# Patient Record
Sex: MALE | Race: WHITE | NOT HISPANIC OR LATINO | Employment: OTHER | URBAN - METROPOLITAN AREA
[De-identification: names, ages, dates, MRNs, and addresses within clinical notes are randomized per-mention and may not be internally consistent; named-entity substitution may affect disease eponyms.]

---

## 2017-07-13 ENCOUNTER — ALLSCRIPTS OFFICE VISIT (OUTPATIENT)
Dept: OTHER | Facility: OTHER | Age: 67
End: 2017-07-13

## 2017-07-13 LAB
BILIRUB UR QL STRIP: NEGATIVE
CLARITY UR: NORMAL
COLOR UR: YELLOW
GLUCOSE (HISTORICAL): NEGATIVE
HGB UR QL STRIP.AUTO: NEGATIVE
KETONES UR STRIP-MCNC: NEGATIVE MG/DL
LEUKOCYTE ESTERASE UR QL STRIP: NEGATIVE
NITRITE UR QL STRIP: NEGATIVE
PH UR STRIP.AUTO: 5.5 [PH]
PROT UR STRIP-MCNC: NEGATIVE MG/DL
SP GR UR STRIP.AUTO: 1.01
UROBILINOGEN UR QL STRIP.AUTO: 0.2

## 2018-01-13 VITALS
BODY MASS INDEX: 35.7 KG/M2 | WEIGHT: 241 LBS | SYSTOLIC BLOOD PRESSURE: 104 MMHG | DIASTOLIC BLOOD PRESSURE: 60 MMHG | HEIGHT: 69 IN

## 2018-04-11 LAB — GLUCOSE SERPL-MCNC: 111 MG/DL (ref 70–99)

## 2018-05-09 DIAGNOSIS — N40.1 BENIGN LOCALIZED HYPERPLASIA OF PROSTATE WITH URINARY OBSTRUCTION AND LOWER URINARY TRACT SYMPTOMS: Primary | ICD-10-CM

## 2018-05-09 DIAGNOSIS — N13.9 BENIGN LOCALIZED HYPERPLASIA OF PROSTATE WITH URINARY OBSTRUCTION AND LOWER URINARY TRACT SYMPTOMS: Primary | ICD-10-CM

## 2018-05-09 RX ORDER — TAMSULOSIN HYDROCHLORIDE 0.4 MG/1
0.4 CAPSULE ORAL
Qty: 90 CAPSULE | Refills: 1 | Status: SHIPPED | OUTPATIENT
Start: 2018-05-09 | End: 2018-11-02 | Stop reason: SDUPTHER

## 2018-07-13 DIAGNOSIS — C61 MALIGNANT NEOPLASM OF PROSTATE (HCC): ICD-10-CM

## 2018-07-19 RX ORDER — MONTELUKAST SODIUM 10 MG/1
10 TABLET ORAL DAILY
Refills: 3 | COMMUNITY
Start: 2018-07-01

## 2018-07-19 RX ORDER — DAPAGLIFLOZIN 5 MG/1
5 TABLET, FILM COATED ORAL DAILY
Refills: 1 | COMMUNITY
Start: 2018-06-22 | End: 2019-07-30 | Stop reason: ALTCHOICE

## 2018-07-19 RX ORDER — DESLORATADINE 5 MG/1
1 TABLET ORAL DAILY
COMMUNITY

## 2018-07-19 RX ORDER — SIMVASTATIN 20 MG
TABLET ORAL
Refills: 3 | COMMUNITY
Start: 2018-04-24

## 2018-07-19 RX ORDER — FLUTICASONE PROPIONATE 50 MCG
SPRAY, SUSPENSION (ML) NASAL
Refills: 3 | COMMUNITY
Start: 2018-06-03

## 2018-07-19 RX ORDER — LOSARTAN POTASSIUM 50 MG/1
50 TABLET ORAL DAILY
Refills: 1 | COMMUNITY
Start: 2018-07-01

## 2018-07-19 RX ORDER — BIOTIN 1 MG
1 TABLET ORAL DAILY
COMMUNITY

## 2018-07-19 RX ORDER — LORATADINE 10 MG/1
10 TABLET ORAL
COMMUNITY
Start: 2014-04-02

## 2018-07-19 RX ORDER — ASPIRIN 81 MG/1
81 TABLET ORAL
COMMUNITY
Start: 2009-04-20

## 2018-07-19 RX ORDER — METFORMIN HYDROCHLORIDE 500 MG/1
TABLET, EXTENDED RELEASE ORAL
Refills: 1 | COMMUNITY
Start: 2018-05-14

## 2018-07-23 ENCOUNTER — OFFICE VISIT (OUTPATIENT)
Dept: UROLOGY | Facility: MEDICAL CENTER | Age: 68
End: 2018-07-23
Payer: MEDICARE

## 2018-07-23 VITALS
DIASTOLIC BLOOD PRESSURE: 68 MMHG | HEIGHT: 68 IN | WEIGHT: 234 LBS | SYSTOLIC BLOOD PRESSURE: 122 MMHG | BODY MASS INDEX: 35.46 KG/M2

## 2018-07-23 DIAGNOSIS — C61 PROSTATE CANCER (HCC): Primary | ICD-10-CM

## 2018-07-23 LAB
SL AMB  POCT GLUCOSE, UA: ABNORMAL
SL AMB LEUKOCYTE ESTERASE,UA: NEGATIVE
SL AMB POCT BILIRUBIN,UA: NEGATIVE
SL AMB POCT BLOOD,UA: NEGATIVE
SL AMB POCT CLARITY,UA: CLEAR
SL AMB POCT COLOR,UA: YELLOW
SL AMB POCT KETONES,UA: NEGATIVE
SL AMB POCT NITRITE,UA: NEGATIVE
SL AMB POCT PH,UA: 5
SL AMB POCT SPECIFIC GRAVITY,UA: 1.02
SL AMB POCT URINE PROTEIN: NEGATIVE
SL AMB POCT UROBILINOGEN: 1

## 2018-07-23 PROCEDURE — 99214 OFFICE O/P EST MOD 30 MIN: CPT | Performed by: UROLOGY

## 2018-07-23 PROCEDURE — 81003 URINALYSIS AUTO W/O SCOPE: CPT | Performed by: UROLOGY

## 2018-07-23 NOTE — LETTER
2018     Fransisco Jarrett MD  [address]  [address] [facility] [address]    Patient: Elsa Joseph   YOB: 1950   Date of Visit: 2018       Dear Dr Odell Campbell: Thank you for referring London Gross to me for evaluation  Below are my notes for this consultation  If you have questions, please do not hesitate to call me  I look forward to following your patient along with you  Sincerely,        Monet Blank MD        CC: No Recipients  Monet Blank MD  2018  4:41 PM  Sign at close encounter  [address] [facility]  [address], [address] [place] [id number]  www  [email or web address]  org      NAME: Elsa Joseph  AGE: 75 y o  SEX: male  : 1950   MRN: [id number]    DATE: 2018  TIME: 4:22 PM    Assessment and Plan:  Prostate cancer doing very well status post IMRT   PSA continues to drop  Follow-up 1 year with another PSA  Chief Complaint     Chief Complaint   Patient presents with    Prostate Cancer     1 yr ck       History of Present Illness   Prostate cancer follow-up:  Underwent IMRT in May of 2015 for [place] 7 prostate cancer in 2 cores on the right  Pretreatment PSA was 5 77  PSA last year was down to 0 42, and continues to erik to 0 37  He has a history of hypogonadism, nephrolithiasis with left renal ESWL in , and cystoscopy ureteroscopy on the right side in   Today's urinalysis is negative except for a large amount of glycosuria  The following portions of the patient's history were reviewed and updated as appropriate: allergies, current medications, past family history, past medical history, past social history, past surgical history and problem list     Review of Systems   Review of Systems    Active Problem List   There is no problem list on file for this patient        Objective   /68   Ht 5' 8" (1 727 m)   Wt 106 kg (234 lb)   BMI 35 58 kg/m²      Physical Exam   Constitutional: He is oriented to person, place, and time  He appears well-developed and well-nourished  HENT:   Head: Normocephalic and atraumatic  Eyes: EOM are normal    Pulmonary/Chest: Effort normal    Musculoskeletal: Normal range of motion  Neurological: He is alert and oriented to person, place, and time  Skin: Skin is warm and dry  Psychiatric: He has a normal mood and affect  His behavior is normal  Judgment and thought content normal            Current Medications     Current Outpatient Prescriptions:     aspirin (ECOTRIN LOW STRENGTH) 81 mg EC tablet, Take 81 mg by mouth, Disp: , Rfl:     Cholecalciferol (VITAMIN D3) 1000 units CAPS, Take 1 tablet by mouth daily, Disp: , Rfl:     desloratadine (CLARINEX) 5 MG tablet, Take 1 tablet by mouth daily, Disp: , Rfl:     FARXIGA 5 MG TABS, Take 5 mg by mouth daily, Disp: , Rfl: 1    fluticasone (FLONASE) 50 mcg/act nasal spray, USE 2 PUFFS IN EACH NOSTRIL DAILY  , Disp: , Rfl: 3    loratadine (CLARITIN) 10 mg tablet, 10 mg, Disp: , Rfl:     losartan (COZAAR) 50 mg tablet, Take 50 mg by mouth daily, Disp: , Rfl: 1    metFORMIN (GLUCOPHAGE-XR) 500 mg 24 hr tablet, TAKE 2 TABLETS (1,000 MG TOTAL) BY MOUTH DAILY WITH BREAKFAST , Disp: , Rfl: 1    montelukast (SINGULAIR) 10 mg tablet, Take 10 mg by mouth daily, Disp: , Rfl: 3    Multiple Vitamin (MULTI VITAMIN MENS PO), Take 1 tablet by mouth daily, Disp: , Rfl:     PROBIOTIC PRODUCT PO, Take 1 tablet by mouth, Disp: , Rfl:     simvastatin (ZOCOR) 20 mg tablet, TAKE 1 TABLET (20 MG TOTAL) BY MOUTH EVERY EVENING , Disp: , Rfl: 3    tamsulosin (FLOMAX) 0 4 mg, Take 1 capsule (0 4 mg total) by mouth daily with dinner, Disp: 90 capsule, Rfl: 1        Reynaldo Padgett MD

## 2018-07-23 NOTE — PROGRESS NOTES
[address] [facility]  [facility]  [address] [facility]  [place], [address] [facility]  [phone]  www  [email or web address]  org      NAME: Antonio Mosley  AGE: 75 y o  SEX: male  : 1950   MRN: [id number]    DATE: 2018  TIME: 4:22 PM    Assessment and Plan:  Prostate cancer doing very well status post IMRT   PSA continues to drop  Follow-up 1 year with another PSA  Chief Complaint     Chief Complaint   Patient presents with    Prostate Cancer     1 yr ck       History of Present Illness   Prostate cancer follow-up:  Underwent IMRT in May of 2015 for Fe 7 prostate cancer in 2 cores on the right  Pretreatment PSA was 5 77  PSA last year was down to 0 42, and continues to erik to 0 37  He has a history of hypogonadism, nephrolithiasis with left renal ESWL in , and cystoscopy ureteroscopy on the right side in   Today's urinalysis is negative except for a large amount of glycosuria  The following portions of the patient's history were reviewed and updated as appropriate: allergies, current medications, past family history, past medical history, past social history, past surgical history and problem list     Review of Systems   Review of Systems    Active Problem List   There is no problem list on file for this patient  Objective   /68   Ht 5' 8" (1 727 m)   Wt 106 kg (234 lb)   BMI 35 58 kg/m²     Physical Exam   Constitutional: He is oriented to person, place, and time  He appears well-developed and well-nourished  HENT:   Head: Normocephalic and atraumatic  Eyes: EOM are normal    Pulmonary/Chest: Effort normal    Musculoskeletal: Normal range of motion  Neurological: He is alert and oriented to person, place, and time  Skin: Skin is warm and dry  Psychiatric: He has a normal mood and affect   His behavior is normal  Judgment and thought content normal            Current Medications     Current Outpatient Prescriptions:     aspirin (ECOTRIN LOW STRENGTH) 81 mg EC tablet, Take 81 mg by mouth, Disp: , Rfl:     Cholecalciferol (VITAMIN D3) 1000 units CAPS, Take 1 tablet by mouth daily, Disp: , Rfl:     desloratadine (CLARINEX) 5 MG tablet, Take 1 tablet by mouth daily, Disp: , Rfl:     FARXIGA 5 MG TABS, Take 5 mg by mouth daily, Disp: , Rfl: 1    fluticasone (FLONASE) 50 mcg/act nasal spray, USE 2 PUFFS IN EACH NOSTRIL DAILY  , Disp: , Rfl: 3    loratadine (CLARITIN) 10 mg tablet, 10 mg, Disp: , Rfl:     losartan (COZAAR) 50 mg tablet, Take 50 mg by mouth daily, Disp: , Rfl: 1    metFORMIN (GLUCOPHAGE-XR) 500 mg 24 hr tablet, TAKE 2 TABLETS (1,000 MG TOTAL) BY MOUTH DAILY WITH BREAKFAST , Disp: , Rfl: 1    montelukast (SINGULAIR) 10 mg tablet, Take 10 mg by mouth daily, Disp: , Rfl: 3    Multiple Vitamin (MULTI VITAMIN MENS PO), Take 1 tablet by mouth daily, Disp: , Rfl:     PROBIOTIC PRODUCT PO, Take 1 tablet by mouth, Disp: , Rfl:     simvastatin (ZOCOR) 20 mg tablet, TAKE 1 TABLET (20 MG TOTAL) BY MOUTH EVERY EVENING , Disp: , Rfl: 3    tamsulosin (FLOMAX) 0 4 mg, Take 1 capsule (0 4 mg total) by mouth daily with dinner, Disp: 90 capsule, Rfl: 1        Brenda Dunaway MD

## 2018-11-02 DIAGNOSIS — N40.1 BENIGN LOCALIZED HYPERPLASIA OF PROSTATE WITH URINARY OBSTRUCTION AND LOWER URINARY TRACT SYMPTOMS: ICD-10-CM

## 2018-11-02 DIAGNOSIS — N13.9 BENIGN LOCALIZED HYPERPLASIA OF PROSTATE WITH URINARY OBSTRUCTION AND LOWER URINARY TRACT SYMPTOMS: ICD-10-CM

## 2018-11-02 RX ORDER — TAMSULOSIN HYDROCHLORIDE 0.4 MG/1
CAPSULE ORAL
Qty: 90 CAPSULE | Refills: 1 | Status: SHIPPED | OUTPATIENT
Start: 2018-11-02 | End: 2019-05-03 | Stop reason: SDUPTHER

## 2019-05-03 DIAGNOSIS — N40.1 BENIGN LOCALIZED HYPERPLASIA OF PROSTATE WITH URINARY OBSTRUCTION AND LOWER URINARY TRACT SYMPTOMS: ICD-10-CM

## 2019-05-03 DIAGNOSIS — N13.9 BENIGN LOCALIZED HYPERPLASIA OF PROSTATE WITH URINARY OBSTRUCTION AND LOWER URINARY TRACT SYMPTOMS: ICD-10-CM

## 2019-05-03 RX ORDER — TAMSULOSIN HYDROCHLORIDE 0.4 MG/1
CAPSULE ORAL
Qty: 90 CAPSULE | Refills: 1 | Status: SHIPPED | OUTPATIENT
Start: 2019-05-03 | End: 2019-10-21 | Stop reason: SDUPTHER

## 2019-06-17 RX ORDER — GLIMEPIRIDE 2 MG/1
2 TABLET ORAL
COMMUNITY

## 2019-06-18 ENCOUNTER — ANESTHESIA EVENT (OUTPATIENT)
Dept: PERIOP | Facility: HOSPITAL | Age: 69
End: 2019-06-18
Payer: MEDICARE

## 2019-06-19 ENCOUNTER — ANESTHESIA (OUTPATIENT)
Dept: PERIOP | Facility: HOSPITAL | Age: 69
End: 2019-06-19
Payer: MEDICARE

## 2019-06-19 ENCOUNTER — HOSPITAL ENCOUNTER (OUTPATIENT)
Facility: HOSPITAL | Age: 69
Setting detail: OUTPATIENT SURGERY
Discharge: HOME | End: 2019-06-19
Attending: PLASTIC SURGERY | Admitting: PLASTIC SURGERY
Payer: MEDICARE

## 2019-06-19 VITALS
HEIGHT: 69 IN | OXYGEN SATURATION: 95 % | TEMPERATURE: 97.3 F | DIASTOLIC BLOOD PRESSURE: 65 MMHG | BODY MASS INDEX: 36.29 KG/M2 | WEIGHT: 245 LBS | RESPIRATION RATE: 16 BRPM | HEART RATE: 70 BPM | SYSTOLIC BLOOD PRESSURE: 125 MMHG

## 2019-06-19 DIAGNOSIS — D49.2 NEOPLASM OF UNSPECIFIED BEHAVIOR OF BONE, SOFT TISSUE, AND SKIN: ICD-10-CM

## 2019-06-19 LAB — GLUCOSE SERPL-MCNC: 128 MG/DL (ref 65–140)

## 2019-06-19 PROCEDURE — 88342 IMHCHEM/IMCYTCHM 1ST ANTB: CPT | Performed by: PATHOLOGY

## 2019-06-19 PROCEDURE — 88304 TISSUE EXAM BY PATHOLOGIST: CPT | Performed by: PATHOLOGY

## 2019-06-19 PROCEDURE — 82948 REAGENT STRIP/BLOOD GLUCOSE: CPT

## 2019-06-19 PROCEDURE — 88341 IMHCHEM/IMCYTCHM EA ADD ANTB: CPT | Performed by: PATHOLOGY

## 2019-06-19 RX ORDER — LIDOCAINE HYDROCHLORIDE AND EPINEPHRINE 5; 5 MG/ML; UG/ML
INJECTION, SOLUTION INFILTRATION; PERINEURAL AS NEEDED
Status: DISCONTINUED | OUTPATIENT
Start: 2019-06-19 | End: 2019-06-19 | Stop reason: HOSPADM

## 2019-06-19 RX ORDER — MIDAZOLAM HYDROCHLORIDE 1 MG/ML
INJECTION INTRAMUSCULAR; INTRAVENOUS AS NEEDED
Status: DISCONTINUED | OUTPATIENT
Start: 2019-06-19 | End: 2019-06-19 | Stop reason: SURG

## 2019-06-19 RX ORDER — MAGNESIUM HYDROXIDE 1200 MG/15ML
LIQUID ORAL AS NEEDED
Status: DISCONTINUED | OUTPATIENT
Start: 2019-06-19 | End: 2019-06-19 | Stop reason: HOSPADM

## 2019-06-19 RX ORDER — EPHEDRINE SULFATE 50 MG/ML
INJECTION INTRAVENOUS AS NEEDED
Status: DISCONTINUED | OUTPATIENT
Start: 2019-06-19 | End: 2019-06-19 | Stop reason: SURG

## 2019-06-19 RX ORDER — SODIUM CHLORIDE, SODIUM LACTATE, POTASSIUM CHLORIDE, CALCIUM CHLORIDE 600; 310; 30; 20 MG/100ML; MG/100ML; MG/100ML; MG/100ML
INJECTION, SOLUTION INTRAVENOUS CONTINUOUS PRN
Status: DISCONTINUED | OUTPATIENT
Start: 2019-06-19 | End: 2019-06-19 | Stop reason: SURG

## 2019-06-19 RX ORDER — LIDOCAINE HYDROCHLORIDE 10 MG/ML
INJECTION, SOLUTION INFILTRATION; PERINEURAL AS NEEDED
Status: DISCONTINUED | OUTPATIENT
Start: 2019-06-19 | End: 2019-06-19 | Stop reason: SURG

## 2019-06-19 RX ORDER — GINSENG 100 MG
CAPSULE ORAL AS NEEDED
Status: DISCONTINUED | OUTPATIENT
Start: 2019-06-19 | End: 2019-06-19 | Stop reason: HOSPADM

## 2019-06-19 RX ORDER — ONDANSETRON 2 MG/ML
INJECTION INTRAMUSCULAR; INTRAVENOUS AS NEEDED
Status: DISCONTINUED | OUTPATIENT
Start: 2019-06-19 | End: 2019-06-19 | Stop reason: SURG

## 2019-06-19 RX ORDER — PROPOFOL 10 MG/ML
INJECTION, EMULSION INTRAVENOUS CONTINUOUS PRN
Status: DISCONTINUED | OUTPATIENT
Start: 2019-06-19 | End: 2019-06-19 | Stop reason: SURG

## 2019-06-19 RX ORDER — DEXAMETHASONE SODIUM PHOSPHATE 4 MG/ML
INJECTION, SOLUTION INTRA-ARTICULAR; INTRALESIONAL; INTRAMUSCULAR; INTRAVENOUS; SOFT TISSUE AS NEEDED
Status: DISCONTINUED | OUTPATIENT
Start: 2019-06-19 | End: 2019-06-19 | Stop reason: SURG

## 2019-06-19 RX ORDER — FENTANYL CITRATE 50 UG/ML
INJECTION, SOLUTION INTRAMUSCULAR; INTRAVENOUS AS NEEDED
Status: DISCONTINUED | OUTPATIENT
Start: 2019-06-19 | End: 2019-06-19 | Stop reason: SURG

## 2019-06-19 RX ORDER — PROPOFOL 10 MG/ML
INJECTION, EMULSION INTRAVENOUS AS NEEDED
Status: DISCONTINUED | OUTPATIENT
Start: 2019-06-19 | End: 2019-06-19 | Stop reason: SURG

## 2019-06-19 RX ORDER — SODIUM CHLORIDE, SODIUM LACTATE, POTASSIUM CHLORIDE, CALCIUM CHLORIDE 600; 310; 30; 20 MG/100ML; MG/100ML; MG/100ML; MG/100ML
75 INJECTION, SOLUTION INTRAVENOUS CONTINUOUS
Status: DISCONTINUED | OUTPATIENT
Start: 2019-06-19 | End: 2019-06-19 | Stop reason: HOSPADM

## 2019-06-19 RX ADMIN — ONDANSETRON HYDROCHLORIDE 4 MG: 2 INJECTION, SOLUTION INTRAMUSCULAR; INTRAVENOUS at 12:28

## 2019-06-19 RX ADMIN — PROPOFOL 80 MG: 10 INJECTION, EMULSION INTRAVENOUS at 12:20

## 2019-06-19 RX ADMIN — LIDOCAINE HYDROCHLORIDE 50 MG: 10 INJECTION, SOLUTION INFILTRATION; PERINEURAL at 12:20

## 2019-06-19 RX ADMIN — MIDAZOLAM HYDROCHLORIDE 2 MG: 1 INJECTION, SOLUTION INTRAMUSCULAR; INTRAVENOUS at 12:19

## 2019-06-19 RX ADMIN — DEXAMETHASONE SODIUM PHOSPHATE 4 MG: 4 INJECTION, SOLUTION INTRA-ARTICULAR; INTRALESIONAL; INTRAMUSCULAR; INTRAVENOUS; SOFT TISSUE at 12:28

## 2019-06-19 RX ADMIN — SODIUM CHLORIDE, SODIUM LACTATE, POTASSIUM CHLORIDE, AND CALCIUM CHLORIDE: .6; .31; .03; .02 INJECTION, SOLUTION INTRAVENOUS at 12:05

## 2019-06-19 RX ADMIN — EPHEDRINE SULFATE 10 MG: 50 INJECTION, SOLUTION INTRAVENOUS at 13:05

## 2019-06-19 RX ADMIN — PROPOFOL 75 MCG/KG/MIN: 10 INJECTION, EMULSION INTRAVENOUS at 12:20

## 2019-06-19 RX ADMIN — EPHEDRINE SULFATE 10 MG: 50 INJECTION, SOLUTION INTRAVENOUS at 12:48

## 2019-06-19 RX ADMIN — SODIUM CHLORIDE, SODIUM LACTATE, POTASSIUM CHLORIDE, AND CALCIUM CHLORIDE: .6; .31; .03; .02 INJECTION, SOLUTION INTRAVENOUS at 13:06

## 2019-06-19 RX ADMIN — FENTANYL CITRATE 50 MCG: 50 INJECTION INTRAMUSCULAR; INTRAVENOUS at 12:19

## 2019-06-20 LAB — GLUCOSE SERPL-MCNC: 116 MG/DL (ref 65–140)

## 2019-07-30 ENCOUNTER — OFFICE VISIT (OUTPATIENT)
Dept: UROLOGY | Facility: MEDICAL CENTER | Age: 69
End: 2019-07-30
Payer: MEDICARE

## 2019-07-30 VITALS
BODY MASS INDEX: 36.58 KG/M2 | DIASTOLIC BLOOD PRESSURE: 80 MMHG | SYSTOLIC BLOOD PRESSURE: 140 MMHG | WEIGHT: 247 LBS | HEART RATE: 68 BPM | HEIGHT: 69 IN

## 2019-07-30 DIAGNOSIS — R31.29 MICROHEMATURIA: ICD-10-CM

## 2019-07-30 DIAGNOSIS — C61 PROSTATE CANCER (HCC): Primary | ICD-10-CM

## 2019-07-30 LAB
SL AMB  POCT GLUCOSE, UA: ABNORMAL
SL AMB LEUKOCYTE ESTERASE,UA: ABNORMAL
SL AMB POCT BILIRUBIN,UA: ABNORMAL
SL AMB POCT BLOOD,UA: ABNORMAL
SL AMB POCT CLARITY,UA: CLEAR
SL AMB POCT COLOR,UA: YELLOW
SL AMB POCT KETONES,UA: ABNORMAL
SL AMB POCT NITRITE,UA: ABNORMAL
SL AMB POCT PH,UA: 5.5
SL AMB POCT SPECIFIC GRAVITY,UA: 1.02
SL AMB POCT URINE PROTEIN: ABNORMAL
SL AMB POCT UROBILINOGEN: 1

## 2019-07-30 PROCEDURE — 81003 URINALYSIS AUTO W/O SCOPE: CPT | Performed by: UROLOGY

## 2019-07-30 PROCEDURE — 99213 OFFICE O/P EST LOW 20 MIN: CPT | Performed by: UROLOGY

## 2019-07-30 RX ORDER — DOXYCYCLINE HYCLATE 100 MG/1
100 CAPSULE ORAL 2 TIMES DAILY
Refills: 0 | COMMUNITY
Start: 2019-06-21 | End: 2019-07-30 | Stop reason: ALTCHOICE

## 2019-07-30 RX ORDER — ASCORBIC ACID 500 MG
500 TABLET ORAL DAILY
COMMUNITY

## 2019-07-30 NOTE — PROGRESS NOTES
[address] [facility]  [facility]  [address] [facility], [address] [place] [address]  [phone]  www  [facility]  org      NAME: Antonio Mosley  AGE: 70 y o  SEX: male  : 1950   MRN: [id number]    DATE: 2019  TIME: 2:07 PM    Assessment and Plan:    CAP- good response to IMRT, check PSA 1 year  Microhematuria- check ASHU, results to pt  Chief Complaint   No chief complaint on file  History of Present Illness   Prostate cancer status post IMRT   PSA 0 36 as of 2019 and it was 0 37 last year  Microhematuria- trace blood this year  He had this a few weeks ago, and had repeat U/A that was negative      The following portions of the patient's history were reviewed and updated as appropriate: allergies, current medications, past family history, past medical history, past social history, past surgical history and problem list   Past Medical History:   Diagnosis Date    BPH with obstruction/lower urinary tract symptoms     BPH without obstruction/lower urinary tract symptoms     Colon polyp     Diabetes mellitus ([address] )     Elevated PSA     Erectile dysfunction     Frequency of urination     Hyperlipidemia     Hypertension     Kidney stone     Malignant neoplasm prostate ([address] )     skin    Microscopic hematuria     Retention of urine     Seasonal allergies     Testicular hypogonadism     Urge incontinence      Past Surgical History:   Procedure Laterality Date    ANAL FISTULOTOMY      APPENDECTOMY      COLONOSCOPY      CYSTOSCOPY      CYSTOSCOPY W/ URETEROSCOPY      CYSTOSCOPY W/ URETEROSCOPY W/ LITHOTRIPSY Right     EXTRACORPOREAL SHOCK WAVE LITHOTRIPSY Left     FACIAL/NECK BIOPSY Left 2019    Procedure: REMOVE NOSE LESION W/EAR GRAFT REPAIR;  Surgeon: Robyn Hines MD;  Location: [address] [place] [address];  Service: Plastics    INSERTION PROSTATE RADIATION SEED      INTRAOPERATIVE RADIATION THERAPY (IORT)  2015    PROSTATE BIOPSY  2015    SKIN LESION EXCISION Left     nose    VASECTOMY  1997     shoulder  Review of Systems   Review of Systems   Gastrointestinal: Negative  Genitourinary: Negative  Active Problem List     Patient Active Problem List   Diagnosis    Neoplasm of unspecified behavior of bone, soft tissue, and skin       Objective   There were no vitals taken for this visit  Physical Exam   Constitutional: He is oriented to person, place, and time  He appears well-developed and well-nourished  HENT:   Head: Normocephalic and atraumatic  Eyes: EOM are normal    Neck: Normal range of motion  Pulmonary/Chest: Effort normal    Musculoskeletal: Normal range of motion  Neurological: He is alert and oriented to person, place, and time  Skin: Skin is warm and dry  Psychiatric: He has a normal mood and affect  His behavior is normal  Judgment and thought content normal            Current Medications     Current Outpatient Medications:     aspirin (ECOTRIN LOW STRENGTH) 81 mg EC tablet, Take 81 mg by mouth, Disp: , Rfl:     Cholecalciferol (VITAMIN D3) 1000 units CAPS, Take 1 tablet by mouth daily, Disp: , Rfl:     desloratadine (CLARINEX) 5 MG tablet, Take 1 tablet by mouth daily, Disp: , Rfl:     FARXIGA 5 MG TABS, Take 5 mg by mouth daily, Disp: , Rfl: 1    fluticasone (FLONASE) 50 mcg/act nasal spray, USE 2 PUFFS IN EACH NOSTRIL DAILY  , Disp: , Rfl: 3    glimepiride (AMARYL) 2 mg tablet, Take 2 mg by mouth every morning before breakfast, Disp: , Rfl:     loratadine (CLARITIN) 10 mg tablet, 10 mg, Disp: , Rfl:     losartan (COZAAR) 50 mg tablet, Take 50 mg by mouth daily, Disp: , Rfl: 1    metFORMIN (GLUCOPHAGE-XR) 500 mg 24 hr tablet, TAKE 2 TABLETS (1,000 MG TOTAL) BY MOUTH DAILY WITH BREAKFAST , Disp: , Rfl: 1    montelukast (SINGULAIR) 10 mg tablet, Take 10 mg by mouth daily, Disp: , Rfl: 3    Multiple Vitamin (MULTI VITAMIN MENS PO), Take 1 tablet by mouth daily, Disp: , Rfl:     PROBIOTIC PRODUCT PO, Take 1 tablet by mouth, Disp: , Rfl:     simvastatin (ZOCOR) 20 mg tablet, TAKE 1 TABLET (20 MG TOTAL) BY MOUTH EVERY EVENING , Disp: , Rfl: 3    tamsulosin (FLOMAX) 0 4 mg, TAKE 1 CAPSULE BY MOUTH DAILY WITH DINNER, Disp: 90 capsule, Rfl: 1        Esme Ho MD

## 2019-08-09 ENCOUNTER — HOSPITAL ENCOUNTER (OUTPATIENT)
Dept: ULTRASOUND IMAGING | Facility: MEDICAL CENTER | Age: 69
Discharge: HOME | End: 2019-08-09
Attending: UROLOGY
Payer: MEDICARE

## 2019-08-09 DIAGNOSIS — R31.29 MICROHEMATURIA: ICD-10-CM

## 2019-08-09 PROCEDURE — 76770 US EXAM ABDO BACK WALL COMP: CPT

## 2019-08-15 ENCOUNTER — TELEPHONE (OUTPATIENT)
Dept: UROLOGY | Facility: MEDICAL CENTER | Age: 69
End: 2019-08-15

## 2019-08-15 NOTE — RESULT ENCOUNTER NOTE
Please inform patient that the results are normal-  He has a 4 mm right renal stone, this does not need treatment  See me as scheduled

## 2019-08-15 NOTE — TELEPHONE ENCOUNTER
----- Message from Gisela Llanos MD sent at 8/15/2019  4:18 PM EDT -----  Please inform patient that the results are normal-  He has a 4 mm right renal stone, this does not need treatment  See me as scheduled

## 2019-08-15 NOTE — TELEPHONE ENCOUNTER
Spoke with the patient; His U/S was normal with the exception of a non-obstructing 4mm stone  As long as it's not causing him and pain or problems, it can be left alone at this time  Patient understands  He is pleased and has no questions or concerns to be addressed at this time

## 2019-10-21 DIAGNOSIS — N13.9 BENIGN LOCALIZED HYPERPLASIA OF PROSTATE WITH URINARY OBSTRUCTION AND LOWER URINARY TRACT SYMPTOMS: ICD-10-CM

## 2019-10-21 DIAGNOSIS — N40.1 BENIGN LOCALIZED HYPERPLASIA OF PROSTATE WITH URINARY OBSTRUCTION AND LOWER URINARY TRACT SYMPTOMS: ICD-10-CM

## 2019-10-21 RX ORDER — TAMSULOSIN HYDROCHLORIDE 0.4 MG/1
CAPSULE ORAL
Qty: 90 CAPSULE | Refills: 1 | Status: SHIPPED | OUTPATIENT
Start: 2019-10-21 | End: 2020-04-24

## 2020-04-24 DIAGNOSIS — N40.1 BENIGN LOCALIZED HYPERPLASIA OF PROSTATE WITH URINARY OBSTRUCTION AND LOWER URINARY TRACT SYMPTOMS: ICD-10-CM

## 2020-04-24 DIAGNOSIS — N13.9 BENIGN LOCALIZED HYPERPLASIA OF PROSTATE WITH URINARY OBSTRUCTION AND LOWER URINARY TRACT SYMPTOMS: ICD-10-CM

## 2020-04-24 RX ORDER — TAMSULOSIN HYDROCHLORIDE 0.4 MG/1
CAPSULE ORAL
Qty: 90 CAPSULE | Refills: 1 | Status: SHIPPED | OUTPATIENT
Start: 2020-04-24 | End: 2020-10-20

## 2020-07-15 ENCOUNTER — TRANSCRIBE ORDERS (OUTPATIENT)
Dept: ADMINISTRATIVE | Facility: HOSPITAL | Age: 70
End: 2020-07-15

## 2020-07-15 DIAGNOSIS — R31.29 MICROSCOPIC HEMATURIA: Primary | ICD-10-CM

## 2020-07-16 ENCOUNTER — HOSPITAL ENCOUNTER (OUTPATIENT)
Dept: ULTRASOUND IMAGING | Facility: HOSPITAL | Age: 70
Discharge: HOME | End: 2020-07-16
Attending: UROLOGY
Payer: MEDICARE

## 2020-07-16 DIAGNOSIS — R31.29 MICROSCOPIC HEMATURIA: ICD-10-CM

## 2020-07-16 PROCEDURE — 76770 US EXAM ABDO BACK WALL COMP: CPT

## 2020-08-05 ENCOUNTER — OFFICE VISIT (OUTPATIENT)
Dept: UROLOGY | Facility: MEDICAL CENTER | Age: 70
End: 2020-08-05
Payer: MEDICARE

## 2020-08-05 VITALS
DIASTOLIC BLOOD PRESSURE: 68 MMHG | HEIGHT: 69 IN | BODY MASS INDEX: 34.07 KG/M2 | TEMPERATURE: 97.8 F | WEIGHT: 230 LBS | SYSTOLIC BLOOD PRESSURE: 118 MMHG

## 2020-08-05 DIAGNOSIS — R31.29 MICROHEMATURIA: ICD-10-CM

## 2020-08-05 DIAGNOSIS — N40.1 BENIGN LOCALIZED HYPERPLASIA OF PROSTATE WITH URINARY OBSTRUCTION AND LOWER URINARY TRACT SYMPTOMS: Primary | ICD-10-CM

## 2020-08-05 DIAGNOSIS — N13.9 BENIGN LOCALIZED HYPERPLASIA OF PROSTATE WITH URINARY OBSTRUCTION AND LOWER URINARY TRACT SYMPTOMS: Primary | ICD-10-CM

## 2020-08-05 DIAGNOSIS — C61 PROSTATE CANCER (HCC): ICD-10-CM

## 2020-08-05 LAB
SL AMB  POCT GLUCOSE, UA: NORMAL
SL AMB LEUKOCYTE ESTERASE,UA: NORMAL
SL AMB POCT BILIRUBIN,UA: NORMAL
SL AMB POCT BLOOD,UA: NORMAL
SL AMB POCT CLARITY,UA: CLEAR
SL AMB POCT COLOR,UA: YELLOW
SL AMB POCT KETONES,UA: NORMAL
SL AMB POCT NITRITE,UA: NORMAL
SL AMB POCT PH,UA: 5.5
SL AMB POCT SPECIFIC GRAVITY,UA: 1.02
SL AMB POCT URINE PROTEIN: NORMAL
SL AMB POCT UROBILINOGEN: 0.2

## 2020-08-05 PROCEDURE — 81003 URINALYSIS AUTO W/O SCOPE: CPT | Performed by: UROLOGY

## 2020-08-05 PROCEDURE — 99214 OFFICE O/P EST MOD 30 MIN: CPT | Performed by: UROLOGY

## 2020-08-05 NOTE — PROGRESS NOTES
Assessment/Plan:      Diagnoses and all orders for this visit:    Benign localized hyperplasia of prostate with urinary obstruction and lower urinary tract symptoms  -     POCT urine dip auto non-scope    Prostate cancer ([facility] [address] )  -     POCT urine dip auto non-scope  -     PSA Total, Diagnostic; Future    Microhematuria  -     POCT urine dip auto non-scope          Subjective:  No complaints     Patient ID: Paulette Fierro is a 78 y o  male  HPI  Prostate cancer follow-up:  Underwent IMRT in May of 2015 for [place] 7 prostate cancer in 2 cores on the right  Pretreatment PSA was 5 77  PSA  continues to erik in the 0 3 range  History of BPH on Flomax      He has a history of hypogonadism, nephrolithiasis with left renal ESWL in 2011, and cystoscopy ureteroscopy on the right side in 2011  Review of Systems   Constitutional: Negative  HENT: Negative  Eyes: Negative  Respiratory: Negative  Cardiovascular: Negative  Gastrointestinal: Negative  Endocrine: Negative  Genitourinary: Negative  Musculoskeletal: Negative  Skin: Negative  Allergic/Immunologic: Negative  Neurological: Negative  Hematological: Negative  Psychiatric/Behavioral: Negative  Objective:     Physical Exam   Constitutional: He is oriented to person, place, and time  He appears well-developed  No distress  HENT:   Head: Normocephalic and atraumatic  Nose: Nose normal    Eyes: Pupils are equal, round, and reactive to light  Conjunctivae are normal  No scleral icterus  Neck: Normal range of motion  Neck supple  Pulmonary/Chest: Effort normal and breath sounds normal  No respiratory distress  Abdominal: Soft  Bowel sounds are normal  He exhibits no distension and no mass  There is no abdominal tenderness  Musculoskeletal: Normal range of motion  General: No tenderness  Neurological: He is alert and oriented to person, place, and time  No cranial nerve deficit     Skin: Skin is warm and dry  No rash noted  No erythema  No pallor  Psychiatric: His behavior is normal  Judgment and thought content normal    Nursing note and vitals reviewed  PSA, TOTAL  (REPEAT DIAGNOSTIC)    Ref Range & Units  7/29/20  6:47 AM    PSA, Total  <4 00 ng/mL  0 31     Renal ultrasound from 07/16/2020:  IMPRESSION:  Tiny echogenic focus in the left lower pole with appearance suggesting small nonobstructing stone  Suspicious change compared to the prior  No other renal or bladder abnormality identified

## 2020-10-20 DIAGNOSIS — N13.9 BENIGN LOCALIZED HYPERPLASIA OF PROSTATE WITH URINARY OBSTRUCTION AND LOWER URINARY TRACT SYMPTOMS: ICD-10-CM

## 2020-10-20 DIAGNOSIS — N40.1 BENIGN LOCALIZED HYPERPLASIA OF PROSTATE WITH URINARY OBSTRUCTION AND LOWER URINARY TRACT SYMPTOMS: ICD-10-CM

## 2020-10-20 RX ORDER — TAMSULOSIN HYDROCHLORIDE 0.4 MG/1
CAPSULE ORAL
Qty: 90 CAPSULE | Refills: 1 | Status: SHIPPED | OUTPATIENT
Start: 2020-10-20 | End: 2021-06-17

## 2021-06-17 DIAGNOSIS — N40.1 BENIGN LOCALIZED HYPERPLASIA OF PROSTATE WITH URINARY OBSTRUCTION AND LOWER URINARY TRACT SYMPTOMS: ICD-10-CM

## 2021-06-17 DIAGNOSIS — N13.9 BENIGN LOCALIZED HYPERPLASIA OF PROSTATE WITH URINARY OBSTRUCTION AND LOWER URINARY TRACT SYMPTOMS: ICD-10-CM

## 2021-06-17 RX ORDER — TAMSULOSIN HYDROCHLORIDE 0.4 MG/1
CAPSULE ORAL
Qty: 90 CAPSULE | Refills: 1 | Status: SHIPPED | OUTPATIENT
Start: 2021-06-17 | End: 2021-12-07

## 2021-08-02 DIAGNOSIS — C61 PROSTATE CANCER (HCC): Primary | ICD-10-CM

## 2021-08-30 ENCOUNTER — OFFICE VISIT (OUTPATIENT)
Dept: UROLOGY | Facility: MEDICAL CENTER | Age: 71
End: 2021-08-30
Payer: MEDICARE

## 2021-08-30 VITALS
DIASTOLIC BLOOD PRESSURE: 80 MMHG | WEIGHT: 224 LBS | HEART RATE: 72 BPM | HEIGHT: 69 IN | BODY MASS INDEX: 33.18 KG/M2 | SYSTOLIC BLOOD PRESSURE: 122 MMHG

## 2021-08-30 DIAGNOSIS — E11.9 TYPE 2 DIABETES MELLITUS WITHOUT COMPLICATION, WITHOUT LONG-TERM CURRENT USE OF INSULIN (HCC): ICD-10-CM

## 2021-08-30 DIAGNOSIS — C61 PROSTATE CANCER (HCC): Primary | ICD-10-CM

## 2021-08-30 PROCEDURE — 99214 OFFICE O/P EST MOD 30 MIN: CPT | Performed by: PHYSICIAN ASSISTANT

## 2021-08-30 RX ORDER — SODIUM FLUORIDE 6 MG/ML
PASTE, DENTIFRICE DENTAL
COMMUNITY
Start: 2021-07-12

## 2021-08-30 NOTE — PROGRESS NOTES
8/30/2021      Chief Complaint   Patient presents with    Benign Prostatic Hypertrophy    Prostate Cancer         Assessment and Plan    66 y o  male previously managed by Dr Lara Rowe     1  Prostate cancer  · S/p IMRT in 2015 for Fe 7 prostate cancer in 2 cores on the right  · PSA: 0 32 (8/10/21)  · Previous PSAs: 0 31 (7/29/2020), 0 36 (7/23/19), 0 37 (7/11/18)  · CHLOE today smooth prostate without appreciable nodule, induration, or asymmetry  · Follow-up in 1 year with PSA  2  BPH with obstruction and lower urinary tract symptoms  · AUA score: 17  · Managed on Flomax 0 4 mg PO daily at dinner time  · No refills needed at this time  · Reviewed bladder irritants  · Continue daily water intake of 40-60 oz  · Follow-up in 1 year  History of Present Illness  Joanne Mayes is a 66 y o  male here for evaluation of prostate cancer and BPH  Patient is s/p IMRT in 2015 for [place] 7 prostate cancer in 2 cores on the right  He also has past history of nephrolithiasis and hypogonadism  PSA has remained stable around 0 3  He is managed on Flomax for his BPH  His AUA score today is 17 but he states he feels well controlled on his current regimen and is happy with his urinary status  He drinks about 5 bottles of water per day  He does not usually consume urinary bladder irritants  He states he occasionally feels the need to strain to urinate  He has urgency occasionally as he is a  and sometimes has to go stent periods of time in between voids  He denies any issues with incontinence  He has nocturia twice per night  He occasionally has a sensation of incomplete bladder emptying but he states this is not common  He is agreeable with plan stated above  Review of Systems   Constitutional: Negative for chills and fever  HENT: Negative  Respiratory: Negative  Gastrointestinal: Negative for abdominal pain     Genitourinary: Positive for difficulty urinating (Occasionally feels he needs to strain) and urgency (, sometimes cuts it close to making it to the bathroom)  Negative for dysuria, enuresis, flank pain, frequency, hematuria, scrotal swelling and testicular pain  Nocturia 2x per night  Occasional sensation of incomplete bladder emptying  Musculoskeletal: Negative  Skin: Negative  Neurological: Negative  AUA SYMPTOM SCORE      Most Recent Value   AUA SYMPTOM SCORE   How often have you had a sensation of not emptying your bladder completely after you finished urinating? 1   How often have you had to urinate again less than two hours after you finished urinating? 5   How often have you found you stopped and started again several times when you urinate? 3   How often have you found it difficult to postpone urination? 5   How often have you had a weak urinary stream?  1   How often have you had to push or strain to begin urination? 0   How many times did you most typically get up to urinate from the time you went to bed at night until the time you got up in the morning? 2   Quality of Life: If you were to spend the rest of your life with your urinary condition just the way it is now, how would you feel about that?  1   AUA SYMPTOM SCORE  17           Vitals  Vitals:    08/30/21 1011   BP: 122/80   Pulse: 72   Weight: 102 kg (224 lb)   Height: 5' 9" (1 753 m)       Physical Exam  Vitals reviewed  Constitutional:       General: He is not in acute distress  Appearance: Normal appearance  He is obese  He is not ill-appearing, toxic-appearing or diaphoretic  HENT:      Head: Normocephalic and atraumatic  Eyes:      Conjunctiva/sclera: Conjunctivae normal    Pulmonary:      Effort: Pulmonary effort is normal  No respiratory distress  Abdominal:      General: There is no distension  Palpations: Abdomen is soft  Tenderness: There is no abdominal tenderness  There is no guarding or rebound     Genitourinary: Comments: Digital rectal exam revealed smooth prostate, without appreciable nodule, induration or asymmetry  Musculoskeletal:         General: Normal range of motion  Cervical back: Normal range of motion  Skin:     General: Skin is warm and dry  Neurological:      General: No focal deficit present  Mental Status: He is alert and oriented to person, place, and time  Psychiatric:         Mood and Affect: Mood normal          Behavior: Behavior normal          Thought Content: Thought content normal          Judgment: Judgment normal        Past History  Past Medical History:   Diagnosis Date    BPH with obstruction/lower urinary tract symptoms     BPH without obstruction/lower urinary tract symptoms     Colon polyp     Diabetes mellitus (HCC)     Elevated PSA     Erectile dysfunction     Frequency of urination     Hyperlipidemia     Hypertension     Kidney stone     Malignant neoplasm prostate ([address] )     skin    Microscopic hematuria     Retention of urine     Seasonal allergies     Testicular hypogonadism     Urge incontinence      Social History     Socioeconomic History    Marital status: /Civil Union     Spouse name: None    Number of children: None    Years of education: None    Highest education level: None   Occupational History    None   Tobacco Use    Smoking status: Never Smoker    Smokeless tobacco: Never Used   Substance and Sexual Activity    Alcohol use: No    Drug use: No    Sexual activity: None   Other Topics Concern    None   Social History Narrative    None     Social Determinants of Health     Financial Resource Strain:     Difficulty of Paying Living Expenses:    Food Insecurity:     Worried About Running Out of Food in the Last Year:     Ran Out of Food in the Last Year:    Transportation Needs:     Lack of Transportation (Medical):      Lack of Transportation (Non-Medical):    Physical Activity:     Days of Exercise per Week:     Minutes of Exercise per Session:    Stress:     Feeling of Stress :    Social Connections:     Frequency of Communication with Friends and Family:     Frequency of Social Gatherings with Friends and Family:     Attends Orthodox Services:     Active Member of Clubs or Organizations:     Attends Club or Organization Meetings:     Marital Status:    Intimate Partner Violence:     Fear of Current or Ex-Partner:     Emotionally Abused:     Physically Abused:     Sexually Abused:      Social History     Tobacco Use   Smoking Status Never Smoker   Smokeless Tobacco Never Used     Family History   Problem Relation Age of Onset    Heart disease Mother     Heart disease Father     Skin cancer Father     Heart attack Brother     Heart disease Brother        The following portions of the patient's history were reviewed and updated as appropriate: allergies, current medications, past medical history, past social history, past surgical history and problem list     Results  No results found for this or any previous visit (from the past 1 hour(s))  ]  No results found for: PSA  No results found for: GLUCOSE, CALCIUM, NA, K, CO2, CL, BUN, CREATININE  No results found for: WBC, HGB, HCT, MCV, PLT    Leonila Macdonald PA-C

## 2021-12-07 DIAGNOSIS — N13.9 BENIGN LOCALIZED HYPERPLASIA OF PROSTATE WITH URINARY OBSTRUCTION AND LOWER URINARY TRACT SYMPTOMS: ICD-10-CM

## 2021-12-07 DIAGNOSIS — N40.1 BENIGN LOCALIZED HYPERPLASIA OF PROSTATE WITH URINARY OBSTRUCTION AND LOWER URINARY TRACT SYMPTOMS: ICD-10-CM

## 2021-12-07 RX ORDER — TAMSULOSIN HYDROCHLORIDE 0.4 MG/1
CAPSULE ORAL
Qty: 90 CAPSULE | Refills: 1 | Status: SHIPPED | OUTPATIENT
Start: 2021-12-07 | End: 2022-06-01

## 2022-06-01 DIAGNOSIS — N13.9 BENIGN LOCALIZED HYPERPLASIA OF PROSTATE WITH URINARY OBSTRUCTION AND LOWER URINARY TRACT SYMPTOMS: ICD-10-CM

## 2022-06-01 DIAGNOSIS — N40.1 BENIGN LOCALIZED HYPERPLASIA OF PROSTATE WITH URINARY OBSTRUCTION AND LOWER URINARY TRACT SYMPTOMS: ICD-10-CM

## 2022-06-01 RX ORDER — TAMSULOSIN HYDROCHLORIDE 0.4 MG/1
CAPSULE ORAL
Qty: 90 CAPSULE | Refills: 1 | Status: SHIPPED | OUTPATIENT
Start: 2022-06-01

## 2022-09-23 ENCOUNTER — OFFICE VISIT (OUTPATIENT)
Dept: UROLOGY | Facility: MEDICAL CENTER | Age: 72
End: 2022-09-23
Payer: MEDICARE

## 2022-09-23 VITALS
BODY MASS INDEX: 33.5 KG/M2 | SYSTOLIC BLOOD PRESSURE: 132 MMHG | HEART RATE: 53 BPM | WEIGHT: 226.2 LBS | DIASTOLIC BLOOD PRESSURE: 84 MMHG | HEIGHT: 69 IN | OXYGEN SATURATION: 99 %

## 2022-09-23 DIAGNOSIS — C61 PROSTATE CANCER (HCC): Primary | ICD-10-CM

## 2022-09-23 DIAGNOSIS — E11.9 TYPE 2 DIABETES MELLITUS WITHOUT COMPLICATION, WITHOUT LONG-TERM CURRENT USE OF INSULIN (HCC): ICD-10-CM

## 2022-09-23 LAB
SL AMB  POCT GLUCOSE, UA: NORMAL
SL AMB LEUKOCYTE ESTERASE,UA: NORMAL
SL AMB POCT BILIRUBIN,UA: NORMAL
SL AMB POCT BLOOD,UA: NORMAL
SL AMB POCT CLARITY,UA: CLEAR
SL AMB POCT COLOR,UA: YELLOW
SL AMB POCT KETONES,UA: NORMAL
SL AMB POCT NITRITE,UA: NORMAL
SL AMB POCT PH,UA: 5
SL AMB POCT SPECIFIC GRAVITY,UA: 1.02
SL AMB POCT URINE PROTEIN: NORMAL
SL AMB POCT UROBILINOGEN: 0.2

## 2022-09-23 PROCEDURE — 81003 URINALYSIS AUTO W/O SCOPE: CPT | Performed by: PHYSICIAN ASSISTANT

## 2022-09-23 PROCEDURE — 99214 OFFICE O/P EST MOD 30 MIN: CPT | Performed by: PHYSICIAN ASSISTANT

## 2022-09-23 NOTE — PROGRESS NOTES
9/23/2022      Chief Complaint   Patient presents with    Prostate Cancer         Assessment and Plan    67 y o  male managed by our office     1  Prostate cancer  · S/p IMRT in 2015 for [place] 7 prostate cancer in 2 cores on the right  · PSA: 0 34 (8/17/22)   ? Previous PSAs: 0 32 (8/10/21), 0 31 (7/29/2020), 0 36 (7/23/19), 0 37 (7/11/18)  · CHLOE today smooth, flat prostate without appreciable nodule, induration, or asymmetry  · Follow-up in 1 year with PSA      2  BPH with LUTS  · AUA score: 14  · Managed on Flomax 0 4 mg PO daily at dinner time  ? No refills needed at this time  · Discussed trying either anticholinergic or beta 3 agonist medication for his persistent urinary symptoms  Patient states he is satisfied with his urinary status and wishes to continue on the Flomax regimen above  · Reviewed bladder irritants  · Continue daily water intake of 40-60 oz  · Follow-up in 1 year  History of Present Illness  Phyllis Denny is a 67 y o  male here for evaluation of prostate cancer and BPH  Patient is s/p IMRT in 2015 for [place] 7 prostate cancer in 2 cores on the right  He also has past history of nephrolithiasis and hypogonadism  PSA has remained stable around 0 3  Patient reports urinary frequency, urgency, nocturia twice per night, and intermittent sensation of incomplete bladder emptying is overall remained unchanged for the past few years  He has been managed on Flomax for the symptoms  He states he has been satisfied with his urinary status  He denies any dysuria gross hematuria  Denies any flank or abdominal pain  He denies any fevers, chills, night sweats, fatigue, or unexpected weight change  Review of Systems   Constitutional: Negative for chills, diaphoresis, fatigue, fever and unexpected weight change  HENT: Negative  Respiratory: Negative  Cardiovascular: Negative  Gastrointestinal: Negative for abdominal pain, nausea and vomiting     Genitourinary: Positive for frequency and urgency  Negative for difficulty urinating, dysuria, flank pain, hematuria, scrotal swelling and testicular pain  Nocturia 2x per night  Sensation of incomplete bladder emptying 50% of the time  Occasional weak and intermittent stream     Musculoskeletal: Negative  Skin: Negative  Neurological: Negative  Vitals  Vitals:    09/23/22 1116   BP: 132/84   BP Location: Left arm   Patient Position: Sitting   Cuff Size: Adult   Pulse: (!) 53   SpO2: 99%   Weight: 103 kg (226 lb 3 2 oz)   Height: 5' 9" (1 753 m)       Physical Exam  Vitals reviewed  Constitutional:       General: He is not in acute distress  Appearance: Normal appearance  He is obese  He is not ill-appearing, toxic-appearing or diaphoretic  HENT:      Head: Normocephalic and atraumatic  Eyes:      Conjunctiva/sclera: Conjunctivae normal    Pulmonary:      Effort: Pulmonary effort is normal  No respiratory distress  Abdominal:      General: There is no distension  Palpations: Abdomen is soft  Tenderness: There is no abdominal tenderness  There is no right CVA tenderness, left CVA tenderness, guarding or rebound  Genitourinary:     Comments: CHLOE today smooth, flat prostate without appreciable nodule, induration, or asymmetry  Musculoskeletal:         General: Normal range of motion  Cervical back: Normal range of motion  Skin:     General: Skin is warm and dry  Neurological:      General: No focal deficit present  Mental Status: He is alert and oriented to person, place, and time  Psychiatric:         Mood and Affect: Mood normal          Behavior: Behavior normal          Thought Content:  Thought content normal          Judgment: Judgment normal        Past History  Past Medical History:   Diagnosis Date    BPH with obstruction/lower urinary tract symptoms     BPH without obstruction/lower urinary tract symptoms     Colon polyp     Diabetes mellitus ([facility] [address] )     Elevated PSA     Erectile dysfunction     Frequency of urination     Hyperlipidemia     Hypertension     Kidney stone     Malignant neoplasm prostate ([address] )     skin    Microscopic hematuria     Retention of urine     Seasonal allergies     Testicular hypogonadism     Urge incontinence      Social History     Socioeconomic History    Marital status: /Civil Union     Spouse name: None    Number of children: None    Years of education: None    Highest education level: None   Occupational History    None   Tobacco Use    Smoking status: Never Smoker    Smokeless tobacco: Never Used   Vaping Use    Vaping Use: Never used   Substance and Sexual Activity    Alcohol use: No    Drug use: No    Sexual activity: None   Other Topics Concern    None   Social History Narrative    None     Social Determinants of Health     Financial Resource Strain: Not on file   Food Insecurity: Not on file   Transportation Needs: Not on file   Physical Activity: Not on file   Stress: Not on file   Social Connections: Not on file   Intimate Partner Violence: Not on file   Housing Stability: Not on file     Social History     Tobacco Use   Smoking Status Never Smoker   Smokeless Tobacco Never Used     Family History   Problem Relation Age of Onset    Heart disease Mother     Heart disease Father     Skin cancer Father     Heart attack Brother     Heart disease Brother        The following portions of the patient's history were reviewed and updated as appropriate: allergies, current medications, past medical history, past social history, past surgical history and problem list     Results  No results found for this or any previous visit (from the past 1 hour(s))  ]  No results found for: PSA  No results found for: GLUCOSE, CALCIUM, NA, K, CO2, CL, BUN, CREATININE  No results found for: WBC, HGB, HCT, MCV, PLT    Jena Em PA-C

## 2023-10-02 ENCOUNTER — OFFICE VISIT (OUTPATIENT)
Dept: UROLOGY | Facility: MEDICAL CENTER | Age: 73
End: 2023-10-02
Payer: MEDICARE

## 2023-10-02 VITALS
SYSTOLIC BLOOD PRESSURE: 118 MMHG | WEIGHT: 225 LBS | HEART RATE: 59 BPM | HEIGHT: 69 IN | OXYGEN SATURATION: 98 % | DIASTOLIC BLOOD PRESSURE: 64 MMHG | BODY MASS INDEX: 33.33 KG/M2

## 2023-10-02 DIAGNOSIS — N13.8 BPH WITH URINARY OBSTRUCTION: Primary | ICD-10-CM

## 2023-10-02 DIAGNOSIS — N40.1 BPH WITH URINARY OBSTRUCTION: Primary | ICD-10-CM

## 2023-10-02 DIAGNOSIS — Z85.46 HISTORY OF PROSTATE CANCER: ICD-10-CM

## 2023-10-02 PROCEDURE — 99213 OFFICE O/P EST LOW 20 MIN: CPT | Performed by: UROLOGY

## 2023-10-02 NOTE — PROGRESS NOTES
HISTORY:    1. History of prostate cancer, radiation therapy in 2015 for [place] score 7 cancer    PSA 0.39 in September 2023  0.3 in August 2022    2. BPH on Flomax, good overall results. Good stream, nocturia x1. Drinks lots of fluids, he says that makes him void a lot during the day, but not bothered         ASSESSMENT / PLAN:    Doing well, excellent chance of good long-term cure of this minimal volume prostate cancer    PSA 1 year with phone report    Follow-up 2 years    The following portions of the patient's history were reviewed and updated as appropriate: allergies, current medications, past family history, past medical history, past social history, past surgical history and problem list.    Review of Systems   All other systems reviewed and are negative. Objective:     Physical Exam  Genitourinary:     Comments: Penis testes normal    Prostate minimally enlarged no nodules          No results found for: "PSA"]  No results found for: "BUN"  No results found for: "CREATININE"  No components found for: "CBC"      Patient Active Problem List   Diagnosis   • Neoplasm of unspecified behavior of bone, soft tissue, and skin   • Type 2 diabetes mellitus without complication, without long-term current use of insulin (HCC)        Diagnoses and all orders for this visit:    BPH with urinary obstruction    History of prostate cancer  -     PSA Total, Diagnostic; Future           Patient ID: Padmini Julian is a 75 y.o. male. Current Outpatient Medications:   •  ascorbic acid (VITAMIN C) 500 mg tablet, Take 500 mg by mouth daily, Disp: , Rfl:   •  aspirin (ECOTRIN LOW STRENGTH) 81 mg EC tablet, Take 81 mg by mouth, Disp: , Rfl:   •  Cholecalciferol (VITAMIN D3) 1000 units CAPS, Take 1 tablet by mouth daily, Disp: , Rfl:   •  desloratadine (CLARINEX) 5 MG tablet, Take 1 tablet by mouth daily, Disp: , Rfl:   •  fluticasone (FLONASE) 50 mcg/act nasal spray, USE 2 PUFFS IN EACH NOSTRIL DAILY. , Disp: , Rfl: 3  •  glimepiride (AMARYL) 2 mg tablet, Take 2 mg by mouth every morning before breakfast, Disp: , Rfl:   •  loratadine (CLARITIN) 10 mg tablet, 10 mg, Disp: , Rfl:   •  losartan (COZAAR) 50 mg tablet, Take 50 mg by mouth daily, Disp: , Rfl: 1  •  metFORMIN (GLUCOPHAGE-XR) 500 mg 24 hr tablet, TAKE 2 TABLETS (1,000 MG TOTAL) BY MOUTH DAILY WITH BREAKFAST., Disp: , Rfl: 1  •  montelukast (SINGULAIR) 10 mg tablet, Take 10 mg by mouth daily, Disp: , Rfl: 3  •  PROBIOTIC PRODUCT PO, Take 1 tablet by mouth, Disp: , Rfl:   •  simvastatin (ZOCOR) 20 mg tablet, TAKE 1 TABLET (20 MG TOTAL) BY MOUTH EVERY EVENING., Disp: , Rfl: 3  •  Sodium Fluoride 5000 PPM 1.1 % PSTE, PLEASE SEE ATTACHED FOR DETAILED DIRECTIONS, Disp: , Rfl:   •  tamsulosin (FLOMAX) 0.4 mg, TAKE 1 CAPSULE BY MOUTH DAILY WITH DINNER., Disp: 90 capsule, Rfl: 1  •  Multiple Vitamin (MULTI VITAMIN MENS PO), Take 1 tablet by mouth daily (Patient not taking: Reported on 8/30/2021), Disp: , Rfl:     Past Medical History:   Diagnosis Date   • BPH with obstruction/lower urinary tract symptoms    • BPH without obstruction/lower urinary tract symptoms    • Colon polyp    • Diabetes mellitus (HCC)    • Elevated PSA    • Erectile dysfunction    • Frequency of urination    • Hyperlipidemia    • Hypertension    • Kidney stone    • Malignant neoplasm prostate (HCC)     skin   • Microscopic hematuria    • Retention of urine    • Seasonal allergies    • Testicular hypogonadism    • Urge incontinence        Past Surgical History:   Procedure Laterality Date   • ANAL FISTULOTOMY     • APPENDECTOMY     • COLONOSCOPY     • CYSTOSCOPY  2011   • CYSTOSCOPY W/ URETEROSCOPY  2011   • CYSTOSCOPY W/ URETEROSCOPY W/ LITHOTRIPSY Right 2011   • EXTRACORPOREAL SHOCK WAVE LITHOTRIPSY Left 2011   • FACIAL/NECK BIOPSY Left 6/19/2019    Procedure: REMOVE NOSE LESION W/EAR GRAFT REPAIR;  Surgeon: Janey Perez MD;  Location: [address] [place] [address];  Service: Plastics   • INSERTION PROSTATE RADIATION SEED  2015   • INTRAOPERATIVE RADIATION THERAPY (IORT)  2015   • PROSTATE BIOPSY  2015   • SKIN CANCER EXCISION  08/2021    face   • SKIN LESION EXCISION Left     nose   • VASECTOMY  1997       Social History

## 2024-04-30 ENCOUNTER — APPOINTMENT (OUTPATIENT)
Age: 74
End: 2024-04-30

## 2024-09-19 ENCOUNTER — APPOINTMENT (OUTPATIENT)
Age: 74
End: 2024-09-19

## 2024-09-27 ENCOUNTER — TELEPHONE (OUTPATIENT)
Dept: UROLOGY | Facility: MEDICAL CENTER | Age: 74
End: 2024-09-27

## 2024-09-27 NOTE — TELEPHONE ENCOUNTER
Spoke to patient requesting he get his PSA done prior to 10/4 appt with Dr. Christian.  Order is in chart.  Went over PSA precautions (no riding bike, ejaculation etc).   Patient states he will go Monday or Tuesday.

## 2024-09-30 LAB — SL AMB PSA, TOTAL: 0.3 NG/ML

## 2024-10-01 ENCOUNTER — TELEPHONE (OUTPATIENT)
Dept: UROLOGY | Facility: MEDICAL CENTER | Age: 74
End: 2024-10-01

## 2024-10-01 NOTE — RESULT ENCOUNTER NOTE
Call pt, normal results, on active surveillance for low-grade prostate cancer.  He agreed that if this PSA was okay, he did not have to come in now.    Follow-up 1 year with PSA and in office exam.

## 2024-10-01 NOTE — TELEPHONE ENCOUNTER
----- Message from Lopez Christian MD sent at 10/1/2024  1:53 PM EDT -----  Call pt, normal results, on active surveillance for low-grade prostate cancer.  He agreed that if this PSA was okay, he did not have to come in now.    Follow-up 1 year with PSA and in office exam.  
Call [facility]. Advised pt of normal PSA results, as per Dr. Christian's note pt can choose to keep or cancel scheduled appt for 10/4/24, other wise pt should be schedule for a fu next year.  
No

## 2024-10-02 ENCOUNTER — TELEPHONE (OUTPATIENT)
Dept: UROLOGY | Facility: MEDICAL CENTER | Age: 74
End: 2024-10-02

## 2024-10-02 NOTE — TELEPHONE ENCOUNTER
----- Message from Estela GUARDADO sent at 10/1/2024  2:48 PM EDT -----  Call placed- [facility]. Advised pt of normal PSA results, as per Dr. Christian's note pt can choose to keep or cancel scheduled appt for 10/4/24, other wise pt should be schedule for a fu next year.

## 2024-10-04 ENCOUNTER — OFFICE VISIT (OUTPATIENT)
Dept: UROLOGY | Facility: MEDICAL CENTER | Age: 74
End: 2024-10-04
Payer: MEDICARE

## 2024-10-04 VITALS
DIASTOLIC BLOOD PRESSURE: 70 MMHG | HEIGHT: 69 IN | OXYGEN SATURATION: 99 % | HEART RATE: 59 BPM | SYSTOLIC BLOOD PRESSURE: 122 MMHG | BODY MASS INDEX: 32.41 KG/M2 | WEIGHT: 218.8 LBS

## 2024-10-04 DIAGNOSIS — N13.8 BPH WITH URINARY OBSTRUCTION: ICD-10-CM

## 2024-10-04 DIAGNOSIS — Z85.46 HISTORY OF PROSTATE CANCER: Primary | ICD-10-CM

## 2024-10-04 DIAGNOSIS — N40.1 BPH WITH URINARY OBSTRUCTION: ICD-10-CM

## 2024-10-04 LAB
SL AMB  POCT GLUCOSE, UA: NORMAL
SL AMB LEUKOCYTE ESTERASE,UA: NORMAL
SL AMB POCT BILIRUBIN,UA: NORMAL
SL AMB POCT BLOOD,UA: NORMAL
SL AMB POCT CLARITY,UA: CLEAR
SL AMB POCT COLOR,UA: NORMAL
SL AMB POCT KETONES,UA: NORMAL
SL AMB POCT NITRITE,UA: NORMAL
SL AMB POCT PH,UA: 5.5
SL AMB POCT SPECIFIC GRAVITY,UA: 1.02
SL AMB POCT URINE PROTEIN: NORMAL
SL AMB POCT UROBILINOGEN: 1

## 2024-10-04 PROCEDURE — 99213 OFFICE O/P EST LOW 20 MIN: CPT | Performed by: UROLOGY

## 2024-10-04 PROCEDURE — 81003 URINALYSIS AUTO W/O SCOPE: CPT | Performed by: UROLOGY

## 2024-10-04 RX ORDER — PANTOPRAZOLE SODIUM 40 MG/1
TABLET, DELAYED RELEASE ORAL DAILY
COMMUNITY
Start: 2024-07-01

## 2024-10-04 RX ORDER — BETAMETHASONE DIPROPIONATE 0.5 MG/G
CREAM TOPICAL
COMMUNITY
Start: 2024-08-25

## 2024-10-04 NOTE — PATIENT INSTRUCTIONS
Have a PSA done in September 2025.  If you do not hear back from us within 1 week, call for the results.  I am sure it will be low, and your next visit with us would be in the fall 2026.

## 2024-10-04 NOTE — PROGRESS NOTES
"   HISTORY:    1.  History of prostate cancer, radiation therapy in 2015 for [place] score 7 cancer  PSA always low, recently 0.3 in September 2024     2.  BPH on Flomax, good overall results.  Good stream, nocturia x1.  Drinks lots of fluids, he says that makes him void a lot during the day, but not bothered         ASSESSMENT / PLAN:    Doing well    PSA with phone report in 1 year    Follow-up 2 years    The following portions of the patient's history were reviewed and updated as appropriate: allergies, current medications, past family history, past medical history, past social history, past surgical history, and problem list.    Review of Systems      Objective:     Physical Exam  Genitourinary:     Comments: Penis testes normal    Prostate minimally large no nodules          0   Lab Value Date/Time    PSA 0.30 09/30/2024 1129   ]  BUN   Date Value Ref Range Status   09/14/2024 23 7 - 28 mg/dL Final     Creatinine   Date Value Ref Range Status   09/14/2024 1.04 0.53 - 1.30 mg/dL Final     No components found for: \"CBC\"      Patient Active Problem List   Diagnosis    Neoplasm of unspecified behavior of bone, soft tissue, and skin    Type 2 diabetes mellitus without complication, without long-term current use of insulin (HCC)        Diagnoses and all orders for this visit:    History of prostate cancer  -     POCT urine dip auto non-scope  -     PSA Total, Diagnostic; Future    BPH with urinary obstruction  -     POCT urine dip auto non-scope    Other orders  -     metFORMIN (GLUCOPHAGE) 1000 MG tablet; Take 1,000 mg by mouth 2 (two) times a day with meals  -     pantoprazole (PROTONIX) 40 mg tablet; Take by mouth daily  -     betamethasone, augmented, (DIPROLENE-AF) 0.05 % cream; APPLY TWICE DAILY TO RASH FOR 1-2 WEEKS FOR FLARE/ITCH           Patient ID: Edgar King is a 74 y.o. male.      Current Outpatient Medications:     ascorbic acid (VITAMIN C) 500 mg tablet, Take 500 mg by mouth daily, Disp: , Rfl: "     aspirin (ECOTRIN LOW STRENGTH) 81 mg EC tablet, Take 81 mg by mouth, Disp: , Rfl:     Cholecalciferol (VITAMIN D3) 1000 units CAPS, Take 1 tablet by mouth daily, Disp: , Rfl:     desloratadine (CLARINEX) 5 MG tablet, Take 1 tablet by mouth daily, Disp: , Rfl:     fluticasone (FLONASE) 50 mcg/act nasal spray, USE 2 PUFFS IN EACH NOSTRIL DAILY., Disp: , Rfl: 3    glimepiride (AMARYL) 2 mg tablet, Take 2 mg by mouth every morning before breakfast, Disp: , Rfl:     loratadine (CLARITIN) 10 mg tablet, 10 mg, Disp: , Rfl:     losartan (COZAAR) 50 mg tablet, Take 50 mg by mouth daily, Disp: , Rfl: 1    metFORMIN (GLUCOPHAGE-XR) 500 mg 24 hr tablet, TAKE 2 TABLETS (1,000 MG TOTAL) BY MOUTH DAILY WITH BREAKFAST., Disp: , Rfl: 1    montelukast (SINGULAIR) 10 mg tablet, Take 10 mg by mouth daily, Disp: , Rfl: 3    Multiple Vitamin (MULTI VITAMIN MENS PO), Take 1 tablet by mouth daily (Patient not taking: Reported on 8/30/2021), Disp: , Rfl:     PROBIOTIC PRODUCT PO, Take 1 tablet by mouth, Disp: , Rfl:     simvastatin (ZOCOR) 20 mg tablet, TAKE 1 TABLET (20 MG TOTAL) BY MOUTH EVERY EVENING., Disp: , Rfl: 3    Sodium Fluoride 5000 PPM 1.1 % PSTE, PLEASE SEE ATTACHED FOR DETAILED DIRECTIONS, Disp: , Rfl:     tamsulosin (FLOMAX) 0.4 mg, TAKE 1 CAPSULE BY MOUTH DAILY WITH DINNER., Disp: 90 capsule, Rfl: 1    Past Medical History:   Diagnosis Date    BPH with obstruction/lower urinary tract symptoms     BPH without obstruction/lower urinary tract symptoms     Colon polyp     Diabetes mellitus (HCC)     Elevated PSA     Erectile dysfunction     Frequency of urination     Hyperlipidemia     Hypertension     Kidney stone     Malignant neoplasm prostate (HCC)     skin    Microscopic hematuria     Retention of urine     Seasonal allergies     Testicular hypogonadism     Urge incontinence        Past Surgical History:   Procedure Laterality Date    ANAL FISTULOTOMY      APPENDECTOMY      COLONOSCOPY      CYSTOSCOPY  2011     CYSTOSCOPY W/ URETEROSCOPY  2011    CYSTOSCOPY W/ URETEROSCOPY W/ LITHOTRIPSY Right 2011    EXTRACORPOREAL SHOCK WAVE LITHOTRIPSY Left 2011    FACIAL/NECK BIOPSY Left 6/19/2019    Procedure: REMOVE NOSE LESION W/EAR GRAFT REPAIR;  Surgeon: Dean Angelo MD;  Location:  MAIN OR;  Service: Plastics    INSERTION PROSTATE RADIATION SEED  2015    INTRAOPERATIVE RADIATION THERAPY (IORT)  2015    PROSTATE BIOPSY  2015    SKIN CANCER EXCISION  08/2021    face    SKIN LESION EXCISION Left     nose    VASECTOMY  1997       Social History

## 2025-04-30 NOTE — TELEPHONE ENCOUNTER
An Auto-fax Refill Request for Tamsulosin was received from [facility]/pharmacy #[id number]  Patient was last seen in July, 2017 by Dr Asmita Vidal; continuation of the medication was approved at that time  He will run out of medication till his next schedule appt  On 7/17/18    Script for same, 90 day supply with 1 refill was queued and forwarded to Dr Asmita Vidal for approval 
[employer]

## 2025-08-23 LAB — SL AMB PSA, TOTAL: 0.27 NG/ML

## (undated) DEVICE — OCCLUSIVE GAUZE STRIP,3% BISMUTH TRIBROMOPHENATE IN PETROLATUM BLEND: Brand: XEROFORM

## (undated) DEVICE — X-RAY DETECTABLE SPONGES,16 PLY: Brand: VISTEC

## (undated) DEVICE — 4-PORT MANIFOLD: Brand: NEPTUNE 2

## (undated) DEVICE — STERILE POLYISOPRENE POWDER-FREE SURGICAL GLOVES: Brand: PROTEXIS

## (undated) DEVICE — SYRINGE 30ML LL

## (undated) DEVICE — SYRINGE 10ML LL CONTROL TOP

## (undated) DEVICE — COTTON BALLS: Brand: DEROYAL

## (undated) DEVICE — TUBING SUCTION 5MM X 12 FT

## (undated) DEVICE — BASIC PACK: Brand: CONVERTORS

## (undated) DEVICE — NDL CNTR 20CT FM MAG: Brand: MEDLINE INDUSTRIES, INC.

## (undated) DEVICE — CABLE BIPOLAR DISP MEGADYNE

## (undated) DEVICE — LAMINECTOMY ARM CRADLE FOAM POSITIONER: Brand: CARDINAL HEALTH

## (undated) DEVICE — SUT SILK 4-0 G-3 789G

## (undated) DEVICE — TIBURON SPLIT SHEET: Brand: CONVERTORS

## (undated) DEVICE — CURITY PLAIN PACKING STRIP: Brand: CURITY

## (undated) DEVICE — SUT ETHILON 4-0 P-S 18 IN 699H

## (undated) DEVICE — ASTOUND STANDARD SURGICAL GOWN, XL: Brand: CONVERTORS

## (undated) DEVICE — INTENDED FOR TISSUE SEPARATION, AND OTHER PROCEDURES THAT REQUIRE A SHARP SURGICAL BLADE TO PUNCTURE OR CUT.: Brand: BARD-PARKER SAFETY BLADES SIZE 15, STERILE

## (undated) DEVICE — NEEDLE BLUNT 18 G X 1 1/2IN

## (undated) DEVICE — NEEDLE 25G X 1 1/2